# Patient Record
Sex: FEMALE | Race: WHITE | NOT HISPANIC OR LATINO | Employment: PART TIME | URBAN - METROPOLITAN AREA
[De-identification: names, ages, dates, MRNs, and addresses within clinical notes are randomized per-mention and may not be internally consistent; named-entity substitution may affect disease eponyms.]

---

## 2023-08-08 ENCOUNTER — HOSPITAL ENCOUNTER (OUTPATIENT)
Dept: DATA CONVERSION | Facility: HOSPITAL | Age: 18
Discharge: HOME | End: 2023-08-08

## 2023-08-08 DIAGNOSIS — Z02.0 ENCOUNTER FOR EXAMINATION FOR ADMISSION TO EDUCATIONAL INSTITUTION: ICD-10-CM

## 2023-08-08 DIAGNOSIS — Z00.00 ENCOUNTER FOR GENERAL ADULT MEDICAL EXAMINATION WITHOUT ABNORMAL FINDINGS: ICD-10-CM

## 2023-08-08 DIAGNOSIS — F41.9 ANXIETY DISORDER, UNSPECIFIED: ICD-10-CM

## 2023-08-08 LAB
ALBUMIN SERPL-MCNC: 4.3 GM/DL (ref 3.5–5)
ALBUMIN/GLOB SERPL: 1.5 RATIO (ref 1.5–3)
ALP BLD-CCNC: 68 U/L (ref 35–125)
ALT SERPL-CCNC: 14 U/L (ref 5–40)
ANION GAP SERPL CALCULATED.3IONS-SCNC: 13 MMOL/L (ref 0–19)
APPEARANCE PLAS: NORMAL
AST SERPL-CCNC: 20 U/L (ref 5–40)
BASOPHILS # BLD AUTO: 0.06 K/UL (ref 0–0.22)
BASOPHILS NFR BLD AUTO: 0.6 % (ref 0–1)
BILIRUB SERPL-MCNC: 0.3 MG/DL (ref 0.1–1.2)
BUN SERPL-MCNC: 12 MG/DL (ref 8–25)
BUN/CREAT SERPL: 17.1 RATIO (ref 8–21)
CALCIUM SERPL-MCNC: 9.3 MG/DL (ref 8.5–10.4)
CHLORIDE SERPL-SCNC: 104 MMOL/L (ref 97–107)
CHOLEST SERPL-MCNC: 174 MG/DL (ref 133–200)
CHOLEST/HDLC SERPL: 3.3 RATIO
CO2 SERPL-SCNC: 23 MMOL/L (ref 24–31)
COLOR SPUN FLD: YELLOW
CREAT SERPL-MCNC: 0.7 MG/DL (ref 0.4–1.6)
DEPRECATED RDW RBC AUTO: 42.5 FL (ref 37–54)
DIFFERENTIAL METHOD BLD: ABNORMAL
EOSINOPHIL # BLD AUTO: 0.09 K/UL (ref 0–0.45)
EOSINOPHIL NFR BLD: 0.9 % (ref 0–3)
ERYTHROCYTE [DISTWIDTH] IN BLOOD BY AUTOMATED COUNT: 13.2 % (ref 11.7–15)
FASTING STATUS PATIENT QL REPORTED: NORMAL
GFR SERPL CREATININE-BSD FRML MDRD: 127 ML/MIN/1.73 M2
GLOBULIN SER-MCNC: 2.8 G/DL (ref 1.9–3.7)
GLUCOSE SERPL-MCNC: 90 MG/DL (ref 65–99)
HBV SURFACE AB SER QL IA: NORMAL
HCT VFR BLD AUTO: 40.6 % (ref 36–44)
HDLC SERPL-MCNC: 53 MG/DL
HGB BLD-MCNC: 13.4 GM/DL (ref 12–15)
IMM GRANULOCYTES # BLD AUTO: 0.05 K/UL (ref 0–0.1)
LDLC SERPL CALC-MCNC: 104 MG/DL (ref 65–130)
LYMPHOCYTES # BLD AUTO: 4.89 K/UL (ref 1.2–3.2)
LYMPHOCYTES NFR BLD MANUAL: 49.9 % (ref 20–40)
MCH RBC QN AUTO: 28.9 PG (ref 26–34)
MCHC RBC AUTO-ENTMCNC: 33 % (ref 31–37)
MCV RBC AUTO: 87.7 FL (ref 80–100)
MEV IGG SER-ACNC: NORMAL
MONOCYTES # BLD AUTO: 0.57 K/UL (ref 0–0.8)
MONOCYTES NFR BLD MANUAL: 5.8 % (ref 0–8)
MUV IGG SER-ACNC: NORMAL
NEUTROPHILS # BLD AUTO: 4.14 K/UL
NEUTROPHILS # BLD AUTO: 4.14 K/UL (ref 1.5–8)
NEUTROPHILS.IMMATURE NFR BLD: 0.5 % (ref 0–1)
NEUTS SEG NFR BLD: 42.3 % (ref 50–70)
NRBC BLD-RTO: 0 /100 WBC
PLATELET # BLD AUTO: 319 K/UL (ref 150–450)
PMV BLD AUTO: 10.9 CU (ref 7–12.6)
POTASSIUM SERPL-SCNC: 3.5 MMOL/L (ref 3.4–5.1)
PROT SERPL-MCNC: 7.1 G/DL (ref 5.9–7.9)
RBC # BLD AUTO: 4.63 M/UL (ref 4–4.9)
RUBV IGG SER-ACNC: 52.27 IU/ML
SODIUM SERPL-SCNC: 140 MMOL/L (ref 133–145)
T4 FREE SERPL-MCNC: 1.4 NG/DL (ref 0.9–1.7)
TRIGL SERPL-MCNC: 84 MG/DL (ref 40–150)
TSH SERPL DL<=0.05 MIU/L-ACNC: 4.92 MIU/L (ref 0.27–4.2)
VZV IGG SER-ACNC: NORMAL
WBC # BLD AUTO: 9.8 K/UL (ref 4.5–11)

## 2023-08-11 LAB
NIL(NEG) CONTROL SPOT COUNT: NORMAL
PANEL A SPOT COUNT: NORMAL
PANEL B SPOT COUNT: NORMAL
POS CONTROL SPOT COUNT: NORMAL
T SPOT RESULT: NORMAL

## 2023-11-04 DIAGNOSIS — E03.8 SUBCLINICAL HYPOTHYROIDISM: ICD-10-CM

## 2023-11-06 PROBLEM — E03.8 SUBCLINICAL HYPOTHYROIDISM: Status: ACTIVE | Noted: 2023-11-06

## 2023-11-06 RX ORDER — LEVOTHYROXINE SODIUM 50 UG/1
50 TABLET ORAL
Qty: 90 TABLET | Refills: 0 | Status: SHIPPED | OUTPATIENT
Start: 2023-11-06 | End: 2023-11-16 | Stop reason: SDUPTHER

## 2023-11-15 ENCOUNTER — OFFICE VISIT (OUTPATIENT)
Dept: PRIMARY CARE | Facility: CLINIC | Age: 18
End: 2023-11-15
Payer: COMMERCIAL

## 2023-11-15 ENCOUNTER — LAB (OUTPATIENT)
Dept: LAB | Facility: LAB | Age: 18
End: 2023-11-15
Payer: COMMERCIAL

## 2023-11-15 VITALS
BODY MASS INDEX: 27.49 KG/M2 | SYSTOLIC BLOOD PRESSURE: 143 MMHG | HEART RATE: 74 BPM | WEIGHT: 165 LBS | HEIGHT: 65 IN | OXYGEN SATURATION: 99 % | DIASTOLIC BLOOD PRESSURE: 81 MMHG

## 2023-11-15 DIAGNOSIS — F41.9 ANXIETY: ICD-10-CM

## 2023-11-15 DIAGNOSIS — Z78.9 HEPATITIS B VACCINATION STATUS UNKNOWN: ICD-10-CM

## 2023-11-15 DIAGNOSIS — M54.31 SCIATICA OF RIGHT SIDE: ICD-10-CM

## 2023-11-15 DIAGNOSIS — E03.8 SUBCLINICAL HYPOTHYROIDISM: ICD-10-CM

## 2023-11-15 DIAGNOSIS — E03.8 SUBCLINICAL HYPOTHYROIDISM: Primary | ICD-10-CM

## 2023-11-15 LAB — TSH SERPL-ACNC: 1.12 MIU/L (ref 0.44–3.98)

## 2023-11-15 PROCEDURE — 1036F TOBACCO NON-USER: CPT | Performed by: NURSE PRACTITIONER

## 2023-11-15 PROCEDURE — 36415 COLL VENOUS BLD VENIPUNCTURE: CPT

## 2023-11-15 PROCEDURE — 99203 OFFICE O/P NEW LOW 30 MIN: CPT | Performed by: NURSE PRACTITIONER

## 2023-11-15 PROCEDURE — 86706 HEP B SURFACE ANTIBODY: CPT

## 2023-11-15 PROCEDURE — 84443 ASSAY THYROID STIM HORMONE: CPT

## 2023-11-15 RX ORDER — PREDNISONE 20 MG/1
TABLET ORAL
Qty: 21 TABLET | Refills: 0 | Status: SHIPPED | OUTPATIENT
Start: 2023-11-15 | End: 2024-04-24 | Stop reason: ALTCHOICE

## 2023-11-15 ASSESSMENT — PATIENT HEALTH QUESTIONNAIRE - PHQ9
2. FEELING DOWN, DEPRESSED OR HOPELESS: NOT AT ALL
1. LITTLE INTEREST OR PLEASURE IN DOING THINGS: NOT AT ALL
SUM OF ALL RESPONSES TO PHQ9 QUESTIONS 1 AND 2: 0

## 2023-11-15 NOTE — PATIENT INSTRUCTIONS
Thank you for seeing me today.  It was a pleasure to meet you!    #LBP W/SCIATICA  Rx Prednisone   Ice and Heat  Wear compression stockings to work and clinicals  May need PT     Get labs done today for Hep B and TSH     RTC AS NEEDED

## 2023-11-15 NOTE — PROGRESS NOTES
"Subjective   Chief Complaint   Patient presents with    New Patient Visit     NPV to establish new primary, Needing blood work to retest titers and check levels since starting levothyroxine, and would like back pain looked at.       Patient ID: Helen Arthur is a 18 y.o. female who presents for New Patient Visit (NPV to establish new primary, Needing blood work to retest titers and check levels since starting levothyroxine, and would like back pain looked at.).    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 [employer]  She is currently dating and is sexually active  No children   Lives w/her parents    PMH: Anxiety, HYPOTHYROIDISM   PSH: Denies     LMP: \"now\"    In Aug 2023 Hep B was negative, so pt received 2 hepatitis B vaccines at pharmacy and is requesting to be re-titered for Hep B    Also her TSH was 4.92, so she was started on Levothyroxine 50 mcg daily  Needs level rechecked     Pt also c/o mid low back pain that radiates right leg  +shooting pain  Denies loss of bowel/bladder function  Had L/S xray in Aug 2023---> normal   Pt was given Prednisone 20 mg x 5 days and Flexeril PRN  Pt states she did not have relief with either   Has pain the worst when she sits, but does have the pain with any movement and sitting       Review of Systems  All 13 systems were reviewed and are within normal limits except positive and pertinent negative responses which are noted below or in HPI.      Objective   /81   Pulse 74   Ht 1.651 m (5' 5\")   Wt 74.8 kg (165 lb)   SpO2 99%   BMI 27.46 kg/m²        Physical Exam  Vitals reviewed.   Cardiovascular:      Pulses: Normal pulses.   Pulmonary:      Effort: Pulmonary effort is normal.   Musculoskeletal:         General: Tenderness present. Normal range of motion.        Back:         Legs:    Neurological:      Mental Status: She is alert.         Assessment/Plan "   Problem List Items Addressed This Visit             ICD-10-CM    Subclinical hypothyroidism - Primary E03.8    Relevant Orders    TSH    Anxiety F41.9     Tried Celexa but it didn't help so she stopped  No concerns today          Sciatica of right side M54.31    Relevant Medications    predniSONE (Deltasone) 20 mg tablet     Other Visit Diagnoses         Codes    Hepatitis B vaccination status unknown     Z78.9    Relevant Orders    Hepatitis B Surface Antibody

## 2023-11-16 DIAGNOSIS — E03.8 SUBCLINICAL HYPOTHYROIDISM: Primary | ICD-10-CM

## 2023-11-16 LAB — HBV SURFACE AB SER-ACNC: >1000 MIU/ML

## 2023-11-16 RX ORDER — LEVOTHYROXINE SODIUM 50 UG/1
50 TABLET ORAL
Qty: 90 TABLET | Refills: 3 | Status: SHIPPED | OUTPATIENT
Start: 2023-11-16 | End: 2024-05-02 | Stop reason: SDUPTHER

## 2023-11-24 DIAGNOSIS — M54.31 SCIATICA OF RIGHT SIDE: Primary | ICD-10-CM

## 2023-12-19 ENCOUNTER — EVALUATION (OUTPATIENT)
Dept: PHYSICAL THERAPY | Facility: CLINIC | Age: 18
End: 2023-12-19
Payer: COMMERCIAL

## 2023-12-19 DIAGNOSIS — M54.31 SCIATICA OF RIGHT SIDE: ICD-10-CM

## 2023-12-19 PROCEDURE — 97110 THERAPEUTIC EXERCISES: CPT | Mod: GP | Performed by: PHYSICAL MEDICINE & REHABILITATION

## 2023-12-19 PROCEDURE — 97161 PT EVAL LOW COMPLEX 20 MIN: CPT | Mod: GP | Performed by: PHYSICAL MEDICINE & REHABILITATION

## 2023-12-19 ASSESSMENT — PAIN SCALES - GENERAL: PAINLEVEL_OUTOF10: 5 - MODERATE PAIN

## 2023-12-19 ASSESSMENT — PAIN - FUNCTIONAL ASSESSMENT: PAIN_FUNCTIONAL_ASSESSMENT: 0-10

## 2023-12-19 NOTE — PROGRESS NOTES
"  Physical Therapy  Physical Therapy Orthopedic Evaluation    Patient Name: Helen Arthur  MRN: [id number]  Today's Date: 12/19/2023  Time Calculation  Start Time: 0855  Stop Time: 0935  Time Calculation (min): 40 min    Insurance:  Visit number: 1   Insurance Type: DNBI    Current Problem  1. Sciatica of right side  Referral to Physical Therapy    Follow Up In Physical Therapy          General:  General  Reason for Referral: Low Back Pain  Referred By: AZALIA Martin  General Comment: Patient presents with low back pain along with radicular symptoms down to bilateral knees. She notes that symptoms will occasionally shoot down to both feet. Patient notes that this pain began approximately one year ago, and was not associated with any specific NERIS. Pain is aggravated with sitting down, squatting and performing hamstring curls in a prone position.    Precautions:   Precautions  Precautions Comment: None    Medical History Form: Reviewed (scanned into chart)    Subjective:   Subjective   Current Condition:   Same    Pain:  Pain Assessment: 0-10  Pain Score: 5 - Moderate pain (While sitting)  Aggravating Factors:  Sitting and Squatting  Relieving Factors:  Rest    Relevant Information (PMH & Previous Tests/Imaging): Lumbar/sacral X-rays were unremarkable  Previous Interventions/Treatments: None    Prior Level of Function (PLOF)  Patient previously independent with all ADLs  Exercise/Physical Activity: Lifts weights regularly  Work/School: Pharmacy technician, radiology technician student at [employer]    Patients Living Environment: Reviewed and no concern    Primary Language: English    Patient's Goal(s) for Therapy: \"Return to PLOF\"    Red Flags: Do you have any of the following? No  Fever/chills, unexplained weight changes, dizziness/fainting, unexplained change in bowel or bladder functions, unexplained malaise or muscle weakness, night pain/sweats, numbness or tingling    Objective:  Objective " "      ROM    Lumbar AROM (%)    Flexion: Fingertips to toes; painful  Extension: WNL        Strength Testing    Hip    (R)  (L)  Flexion: 4/5  4/5      Abduction: 4+/5  4+/5          Gait: No abnormalities observed        Special Tests    90-90 SLR Test: Negative  JUAN Test: Moderately limited bilaterally   Prone Segmental Instability Test: Positive      Radicular Symptoms: Patient reports radicular symptoms that will radiate down to both knees      Outcome Measures:  Other Measures  Oswestry Disablity Index (PLACIDO): 24%       Treatment Performed:  Therapeutic Exercise  Therapeutic Exercise Activity 1: Neutral spine curl up: 5x10\" holds each side  Therapeutic Exercise Activity 2: Side plank with knees bent: 3x15\" holds each side  Therapeutic Exercise Activity 3: Bird Dogs: 5x10\" holds each side    EDUCATION: Home exercise program, plan of care, activity modifications, pain management, and injury pathology  Outpatient Education  Education Comment: Access Code: [email or web address]  URL: [email or web address]  Date: 12/19/2023  Prepared by: Kayden Pettit    Exercises  - Neutral Curl Up with Straight Leg  - 1-2 x daily - 7 x weekly - 2 sets - 5 reps - 10 second hold  - Side Plank on Knees  - 1-2 x daily - 7 x weekly - 2 sets - 3 reps - 15 second hold  - Bird Dog  - 1-2 x daily - 7 x weekly - 1 sets - 5 reps - 10 second hold    Assessment: Patient presents with signs and symptoms consistent with low back pain, resulting in limited participation in pain-free ADLs and inability to perform at their prior level of function. Pt would benefit from physical therapy to address the impairments found & listed previously in the objective section in order to return to safe and pain-free ADLs and prior level of function.       Plan:  PT Plan: Skilled PT  PT Frequency: 1 time per week  Duration: 6 weeks  Onset Date: 12/19/22  Number of Treatments Authorized: 1 of 8  Rehab Potential: Good  Plan of Care Agreement: " Patient  Planned Interventions include: therapeutic exercise, self-care home management, manual therapy, therapeutic activities, gait training, neuromuscular coordination, vasopneumatic, dry needling, aquatic therapy      Goals: Set and discussed today  Active       PT Problem       Patient to score 10% or less on Modified PLACIDO in order to show improvement in overall quality of life.        Start:  12/19/23    Expected End:  02/02/24            Patient to achieve 5/5 strength in bilateral hip musculature in order to improve tolerance to lifting weights and exercising.        Start:  12/19/23    Expected End:  02/02/24            Patient to be able to sit for 1+ hours without any increased low back pain in order to be able to sit through a class lecture.        Start:  12/19/23    Expected End:  02/02/24            Patient to demonstrate independence with HEP in order to establish self management of symptoms.       Start:  12/19/23    Expected End:  02/02/24                Plan of care was developed with input and agreement by the patient      Kayden Pettit PT

## 2024-01-03 ENCOUNTER — TREATMENT (OUTPATIENT)
Dept: PHYSICAL THERAPY | Facility: CLINIC | Age: 19
End: 2024-01-03
Payer: COMMERCIAL

## 2024-01-03 DIAGNOSIS — M54.31 SCIATICA OF RIGHT SIDE: ICD-10-CM

## 2024-01-03 PROCEDURE — 97112 NEUROMUSCULAR REEDUCATION: CPT | Mod: GP | Performed by: PHYSICAL MEDICINE & REHABILITATION

## 2024-01-03 PROCEDURE — 97110 THERAPEUTIC EXERCISES: CPT | Mod: GP | Performed by: PHYSICAL MEDICINE & REHABILITATION

## 2024-01-03 PROCEDURE — 97530 THERAPEUTIC ACTIVITIES: CPT | Mod: GP | Performed by: PHYSICAL MEDICINE & REHABILITATION

## 2024-01-03 ASSESSMENT — PAIN SCALES - GENERAL: PAINLEVEL_OUTOF10: 5 - MODERATE PAIN

## 2024-01-03 ASSESSMENT — PAIN - FUNCTIONAL ASSESSMENT: PAIN_FUNCTIONAL_ASSESSMENT: 0-10

## 2024-01-03 NOTE — PROGRESS NOTES
"  Physical Therapy Treatment    Patient Name: Helen Arthur  MRN: [id number]  Today's Date: 1/3/2024  Time Calculation  Start Time: 0833  Stop Time: 0911  Time Calculation (min): 38 min  Current Problem  1. Sciatica of right side  Follow Up In Physical Therapy          Insurance:  Number of Treatments Authorized: 2 of 40          Subjective   General  Reason for Referral: Low Back Pain  Referred By: AZALIA Martin  General Comment: Patient reports minimal changes in her pain levels this visit. She has remained adherent to her HEP, and reports no issues with it thus far.    Performing HEP?: Yes    Precautions  Precautions  Precautions Comment: None  Pain  Pain Assessment: 0-10  Pain Score: 5 - Moderate pain    Objective   Treatments:    Therapeutic Exercise  Therapeutic Exercise Activity 1: Neutral spine curl up: 5x10\" holds each side  Therapeutic Exercise Activity 2: Side plank with knees bent: 3x15\" holds each side  Therapeutic Exercise Activity 3: Bird Dogs: 5x10\" holds each side  Therapeutic Exercise Activity 4: Multifidus twist: 10x10\" holds each side    Balance/Neuromuscular Re-Education  Balance/Neuromuscular Re-Education Activity 1: Hip hinge with dowel for cues x20  Balance/Neuromuscular Re-Education Activity 2: Pallof Press: 2x10x5\" holds each direction w 7.5# at Matrix  Balance/Neuromuscular Re-Education Activity 3: Squats with adjusted form: 3x5    Therapeutic Activity  Therapeutic Activity 1: KB Deadlift: 3x10 from 4\" box w 20#      Assessment:  PT Assessment  Assessment Comment: Today's visit focused on progressing activities to improve patient's trunk stability, hip hinging technique and functional ability. Patient demonstrated good effort toward today's progressions. No increased pain reported at the conclusion of the session. Overall response toward today's interventions was great.    Plan:  OP PT Plan  PT Plan: Skilled PT (Continue with current POC. Progress stability and strength as " tolerated.)  PT Frequency: 1 time per week  Duration: 6 weeks  Onset Date: 12/19/22  Number of Treatments Authorized: 2 of 40    Goals:  Active       PT Problem       Patient to score 10% or less on Modified PLACIDO in order to show improvement in overall quality of life.        Start:  12/19/23    Expected End:  02/02/24            Patient to achieve 5/5 strength in bilateral hip musculature in order to improve tolerance to lifting weights and exercising.        Start:  12/19/23    Expected End:  02/02/24            Patient to be able to sit for 1+ hours without any increased low back pain in order to be able to sit through a class lecture.        Start:  12/19/23    Expected End:  02/02/24            Patient to demonstrate independence with HEP in order to establish self management of symptoms.       Start:  12/19/23    Expected End:  02/02/24                 Kayden Pettit, PT

## 2024-01-10 ENCOUNTER — APPOINTMENT (OUTPATIENT)
Dept: PHYSICAL THERAPY | Facility: CLINIC | Age: 19
End: 2024-01-10
Payer: COMMERCIAL

## 2024-01-19 ENCOUNTER — APPOINTMENT (OUTPATIENT)
Dept: PHYSICAL THERAPY | Facility: CLINIC | Age: 19
End: 2024-01-19
Payer: COMMERCIAL

## 2024-01-26 ENCOUNTER — TREATMENT (OUTPATIENT)
Dept: PHYSICAL THERAPY | Facility: CLINIC | Age: 19
End: 2024-01-26
Payer: COMMERCIAL

## 2024-01-26 DIAGNOSIS — M54.31 SCIATICA OF RIGHT SIDE: ICD-10-CM

## 2024-01-26 PROCEDURE — 97530 THERAPEUTIC ACTIVITIES: CPT | Mod: GP | Performed by: PHYSICAL MEDICINE & REHABILITATION

## 2024-01-26 PROCEDURE — 97112 NEUROMUSCULAR REEDUCATION: CPT | Mod: GP | Performed by: PHYSICAL MEDICINE & REHABILITATION

## 2024-01-26 PROCEDURE — 97110 THERAPEUTIC EXERCISES: CPT | Mod: GP | Performed by: PHYSICAL MEDICINE & REHABILITATION

## 2024-01-26 ASSESSMENT — PAIN SCALES - GENERAL: PAINLEVEL_OUTOF10: 0 - NO PAIN

## 2024-01-26 ASSESSMENT — PAIN - FUNCTIONAL ASSESSMENT: PAIN_FUNCTIONAL_ASSESSMENT: 0-10

## 2024-01-26 NOTE — PROGRESS NOTES
"  Physical Therapy Treatment    Patient Name: Helen Arthur  MRN: [id number]  Today's Date: 1/26/2024  Time Calculation  Start Time: 0920  Stop Time: 0958  Time Calculation (min): 38 min  Current Problem  1. Sciatica of right side  Follow Up In Physical Therapy          Insurance:  Number of Treatments Authorized: 3 of 40          Subjective   General  Reason for Referral: Low Back Pain  Referred By: JOVANNI Martin-CNP  General Comment: Patient reports feeling improvement in pain levels since her last visit. She has been exercising frequently, working on trunk stability and correcting her squat pattern.    Performing HEP?: Yes    Precautions  Precautions  Precautions Comment: None  Pain  Pain Assessment: 0-10  Pain Score: 0 - No pain    Objective   Treatments:    Therapeutic Exercise  Therapeutic Exercise Activity 1: Neutral spine curl up: 5x10\" holds each side  Therapeutic Exercise Activity 2: Side plank with knees bent: 3x15\" holds each side  Therapeutic Exercise Activity 3: Bird Dogs: 5x10\" holds each side  Therapeutic Exercise Activity 4: BB Bent over Row: 3x5 w 65#    Balance/Neuromuscular Re-Education  Balance/Neuromuscular Re-Education Activity 1: Hip hinge with dowel for cues x20  Balance/Neuromuscular Re-Education Activity 2: Squats with yellow loop around knees 2x5  Balance/Neuromuscular Re-Education Activity 3: Pallof Press: 2x10x5\" holds each direction w 7.5# at Matrix    Therapeutic Activity  Therapeutic Activity 1: KB Deadlift: 3x10 from 4\" box w 35#  Therapeutic Activity 2: BB Deadlift: 3x10 w 65# and resistance band    Assessment:  PT Assessment  Assessment Comment: Today's visit focused on progressing activities to improve patient's trunk stability, hip hinging technique and functional ability. Patient demonstrated good effort toward today's progressions. No increased pain reported at the conclusion of the session. Overall response toward today's interventions was great.    Plan:  OP PT Plan  PT " Plan: Skilled PT (Continue with current POC. Progress stability and strength as tolerated.)  PT Frequency: 1 time per week  Duration: 6 weeks  Onset Date: 12/19/22  Number of Treatments Authorized: 3 of 40    Goals:  Active       PT Problem       Patient to score 10% or less on Modified PLACIDO in order to show improvement in overall quality of life.        Start:  12/19/23    Expected End:  02/02/24            Patient to achieve 5/5 strength in bilateral hip musculature in order to improve tolerance to lifting weights and exercising.        Start:  12/19/23    Expected End:  02/02/24            Patient to be able to sit for 1+ hours without any increased low back pain in order to be able to sit through a class lecture.        Start:  12/19/23    Expected End:  02/02/24            Patient to demonstrate independence with HEP in order to establish self management of symptoms.       Start:  12/19/23    Expected End:  02/02/24                 Kayden Pettit, PT

## 2024-02-09 ENCOUNTER — APPOINTMENT (OUTPATIENT)
Dept: PHYSICAL THERAPY | Facility: CLINIC | Age: 19
End: 2024-02-09
Payer: COMMERCIAL

## 2024-04-23 NOTE — PROGRESS NOTES
"Helen Arthur is a 18 y.o. female who presents today for c/o \"bilateral hand swelling\"    No chief complaint on file.      Symptoms: B/L hand swelling     Symptom onset has been {gen onset/course:033354} for a time period of *** {gen duration:721795}.     Severity is described as {gen severity:785736}.     Course of her symptoms over time is {gen onset/course:558409}.    Has taken ***     Allergies   Allergen Reactions    Sulfa (Sulfonamide Antibiotics) Unknown    Sulfamethoxazole-Trimethoprim Swelling       Review of Systems  ROS was completed and all systems are negative with the exception of what was noted in the the HPI.       Objective   Vitals:  There were no vitals taken for this visit.      Current Outpatient Medications   Medication Instructions    levothyroxine (SYNTHROID, LEVOXYL) 50 mcg, oral, Daily before breakfast    predniSONE (Deltasone) 20 mg tablet Take 3 tabs (60mg) daily for 5 days, then take 2 tabs (40mg) daily for 2 days, then take 1 tab (20mg) daily for 2 days.         Physical Exam    Assessment/Plan   {Assess/PlanSmartLinks:12530}          "

## 2024-04-24 ENCOUNTER — TELEMEDICINE (OUTPATIENT)
Dept: PRIMARY CARE | Facility: CLINIC | Age: 19
End: 2024-04-24
Payer: COMMERCIAL

## 2024-04-24 DIAGNOSIS — E03.8 SUBCLINICAL HYPOTHYROIDISM: ICD-10-CM

## 2024-04-24 DIAGNOSIS — R60.0 LOCALIZED EDEMA: Primary | ICD-10-CM

## 2024-04-24 PROCEDURE — 99213 OFFICE O/P EST LOW 20 MIN: CPT | Performed by: NURSE PRACTITIONER

## 2024-04-24 NOTE — PROGRESS NOTES
Subjective   Patient ID: Helen Arthur is a 18 y.o. female who presents for No chief complaint on file..    This is a virtual visit with video.  Patient has requested and is agreeable to be on video. I performed this visit using real-time telehealth tools; including audio/video connection between the pt. and I at [facility].       Patient was scheduled primary care who was sick.  So staff rescheduled her as a virtual visit.  Patient was seen on camera sitting in her car.  She states that she has been having on and off swelling in her hands, belching and some dizziness when she is moving around bending and working out.  She states she works out regularly and her fluid intake is very high and she is very cognizant of her protein intake.  She is wondering if she has a vitamin deficiency.    I discussed with her the need to bring her in for a in person visit to check vital signs and then we can decide on the next course of treatment.  I will review this patient with her primary care provider.         Review of Systems    Objective   There were no vitals taken for this visit.    Physical Exam  Constitutional:       Comments: Seen on video sitting in her car no distress smiling interactive and very pleasant.  Her hands are on her steering wheel they do not look edematous on video.  No shortness of breath and speaks in full sentences without difficulty         Assessment/Plan   Diagnoses and all orders for this visit:  Localized edema  Comments:  Many possible causes I would like to see what her vital signs are.  We should check blood work including her TSH and free T4 and BMP.  Patient is agreeable  Subclinical hypothyroidism    MA will reach out to patient to get her scheduled for inpatient visit

## 2024-04-30 NOTE — PROGRESS NOTES
"Subjective   Chief Complaint   Patient presents with    SDS     Patient comes in today as a SDS for swelling of her hands and legs. She states this has been going on for 2 weeks.        Patient ID: Helen Arthur is a 18 y.o. female who presents for SDS (Patient comes in today as a SDS for swelling of her hands and legs. She states this has been going on for 2 weeks. ).    HPI  Helen is a 19 yo female presenting today for c/o \"hand swelling\"     Onset: 2 weeks   States sometimes she wakes up with them swollen and other days it occurs later in the day   Also has some tingling in her hands on and off   Denies neck pain   States she eats \"healthy\" and doesn't feel she consumes foods high in salt     Pt denies any CP/Palpitations/SOB/fatigue   /72    Allergies   Allergen Reactions    Sulfa (Sulfonamide Antibiotics) Unknown    Sulfamethoxazole-Trimethoprim Swelling       Review of Systems  ROS was completed and all systems are negative with the exception of what was noted in the the HPI.       Objective   /72   Pulse 65   Ht 1.651 m (5' 5\")   Wt 67.9 kg (149 lb 12.8 oz)   SpO2 98%   BMI 24.93 kg/m²      Current Outpatient Medications   Medication Instructions    levothyroxine (SYNTHROID, LEVOXYL) 50 mcg, oral, Daily before breakfast       Physical Exam  Vitals reviewed.   Cardiovascular:      Pulses: Normal pulses.      Heart sounds: Normal heart sounds. No murmur heard.  Pulmonary:      Effort: Pulmonary effort is normal.      Breath sounds: Normal breath sounds.   Musculoskeletal:      Right hand: No swelling. Normal range of motion. Normal strength. Normal sensation.      Left hand: No swelling. Normal range of motion. Normal strength. Normal sensation.      Right lower leg: No edema.      Left lower leg: No edema.      Comments: No hand swelling noted    Neurological:      Mental Status: She is alert.         Assessment/Plan   Problem List Items Addressed This Visit    None  Visit Diagnoses         " Codes    Bilateral hand swelling    -  Primary M79.89    Relevant Orders    TSH    CBC    Basic Metabolic Panel    Vitamin D 25-Hydroxy,Total (for eval of Vitamin D levels)    Vitamin B12

## 2024-05-01 ENCOUNTER — OFFICE VISIT (OUTPATIENT)
Dept: PRIMARY CARE | Facility: CLINIC | Age: 19
End: 2024-05-01
Payer: COMMERCIAL

## 2024-05-01 ENCOUNTER — LAB (OUTPATIENT)
Dept: LAB | Facility: LAB | Age: 19
End: 2024-05-01
Payer: COMMERCIAL

## 2024-05-01 VITALS
WEIGHT: 149.8 LBS | HEART RATE: 65 BPM | HEIGHT: 65 IN | BODY MASS INDEX: 24.96 KG/M2 | SYSTOLIC BLOOD PRESSURE: 122 MMHG | DIASTOLIC BLOOD PRESSURE: 72 MMHG | OXYGEN SATURATION: 98 %

## 2024-05-01 DIAGNOSIS — M79.89 BILATERAL HAND SWELLING: ICD-10-CM

## 2024-05-01 DIAGNOSIS — M79.89 BILATERAL HAND SWELLING: Primary | ICD-10-CM

## 2024-05-01 LAB
25(OH)D3 SERPL-MCNC: 24 NG/ML (ref 30–100)
ANION GAP SERPL CALC-SCNC: 12 MMOL/L (ref 10–20)
BUN SERPL-MCNC: 11 MG/DL (ref 6–23)
CALCIUM SERPL-MCNC: 9.5 MG/DL (ref 8.6–10.3)
CHLORIDE SERPL-SCNC: 102 MMOL/L (ref 98–107)
CO2 SERPL-SCNC: 29 MMOL/L (ref 21–32)
CREAT SERPL-MCNC: 0.75 MG/DL (ref 0.5–1.05)
EGFRCR SERPLBLD CKD-EPI 2021: >90 ML/MIN/1.73M*2
ERYTHROCYTE [DISTWIDTH] IN BLOOD BY AUTOMATED COUNT: 13.2 % (ref 11.5–14.5)
GLUCOSE SERPL-MCNC: 68 MG/DL (ref 74–99)
HCT VFR BLD AUTO: 42.1 % (ref 36–46)
HGB BLD-MCNC: 13.7 G/DL (ref 12–16)
MCH RBC QN AUTO: 29.6 PG (ref 26–34)
MCHC RBC AUTO-ENTMCNC: 32.5 G/DL (ref 32–36)
MCV RBC AUTO: 91 FL (ref 80–100)
NRBC BLD-RTO: 0 /100 WBCS (ref 0–0)
PLATELET # BLD AUTO: 296 X10*3/UL (ref 150–450)
POTASSIUM SERPL-SCNC: 4 MMOL/L (ref 3.5–5.3)
RBC # BLD AUTO: 4.63 X10*6/UL (ref 4–5.2)
SODIUM SERPL-SCNC: 139 MMOL/L (ref 136–145)
TSH SERPL-ACNC: 1.85 MIU/L (ref 0.44–3.98)
VIT B12 SERPL-MCNC: 334 PG/ML (ref 211–911)
WBC # BLD AUTO: 6.9 X10*3/UL (ref 4.4–11.3)

## 2024-05-01 PROCEDURE — 80048 BASIC METABOLIC PNL TOTAL CA: CPT

## 2024-05-01 PROCEDURE — 99212 OFFICE O/P EST SF 10 MIN: CPT | Performed by: NURSE PRACTITIONER

## 2024-05-01 PROCEDURE — 36415 COLL VENOUS BLD VENIPUNCTURE: CPT

## 2024-05-01 PROCEDURE — 85027 COMPLETE CBC AUTOMATED: CPT

## 2024-05-01 PROCEDURE — 84443 ASSAY THYROID STIM HORMONE: CPT

## 2024-05-01 PROCEDURE — 1036F TOBACCO NON-USER: CPT | Performed by: NURSE PRACTITIONER

## 2024-05-01 PROCEDURE — 82306 VITAMIN D 25 HYDROXY: CPT

## 2024-05-01 PROCEDURE — 82607 VITAMIN B-12: CPT

## 2024-05-01 NOTE — PATIENT INSTRUCTIONS
Thank you for seeing me today.  It was a pleasure to see you again!    #Hx B/L HAND SWELLING   Labs ordered  Watch salt intake; avoid foods > 5% sodium  Exercise regularly    For assistance with scheduling referrals or consultations, please call [phone] or [phone].    For laboratory, radiology, and other tests, please call [phone] ([phone] for pediatrics).   If you do not get results within 7-10 days, or you have any questions or concerns, please send a message, call the office ([phone]), or return to the office for a follow-up appointment.     For acute/sick visits, if you are unable to get an office visit, you can do a  On Demand Virtual Visit that is accessible via your My Chart account.  For emergencies, call 9-1-1 or go to the nearest Emergency Department.     Please schedule additional appointment(s) to address concern(s) not addressed today.    Please review prescription inserts and published information for possible adverse effects of all medications.     In general, results are discussed over the phone or via  [employer].     You can see your health information, review clinical summaries from office visits & test results online when you follow your health with MY  Chart, a personal health record.   To sign up go to [email or web address].   If you need assistance with signing up or trouble getting into your account call [employer] Patient Line 24/7 at [phone]     [facility] AS NEEDED

## 2024-05-02 DIAGNOSIS — E03.8 SUBCLINICAL HYPOTHYROIDISM: ICD-10-CM

## 2024-05-02 DIAGNOSIS — E55.9 VITAMIN D DEFICIENCY: Primary | ICD-10-CM

## 2024-05-02 RX ORDER — ERGOCALCIFEROL 1.25 MG/1
50000 CAPSULE ORAL
Qty: 12 CAPSULE | Refills: 3 | Status: SHIPPED | OUTPATIENT
Start: 2024-05-05 | End: 2025-04-06

## 2024-05-02 RX ORDER — LEVOTHYROXINE SODIUM 50 UG/1
50 TABLET ORAL
Qty: 90 TABLET | Refills: 3 | Status: SHIPPED | OUTPATIENT
Start: 2024-05-02 | End: 2025-04-27

## 2024-05-02 NOTE — RESULT ENCOUNTER NOTE
Helen Arthur    I have reviewed all of your blood work and your vitamin D is again very low. I have sent the prescription to your pharmacy. Please take as prescribed.     The rest of your labs were fine.     If you have any questions, please feel free to call my office.    Take Care,   Lori

## 2024-07-01 DIAGNOSIS — Z11.1 SCREENING FOR TUBERCULOSIS: Primary | ICD-10-CM

## 2024-07-09 ENCOUNTER — LAB (OUTPATIENT)
Dept: LAB | Facility: LAB | Age: 19
End: 2024-07-09
Payer: COMMERCIAL

## 2024-07-09 DIAGNOSIS — Z11.1 SCREENING FOR TUBERCULOSIS: ICD-10-CM

## 2024-07-09 PROCEDURE — 36415 COLL VENOUS BLD VENIPUNCTURE: CPT

## 2024-07-09 PROCEDURE — 86481 TB AG RESPONSE T-CELL SUSP: CPT

## 2024-07-12 LAB
NIL(NEG) CONTROL SPOT COUNT: NORMAL
PANEL A SPOT COUNT: 0
PANEL B SPOT COUNT: 0
POS CONTROL SPOT COUNT: NORMAL
T-SPOT. TB INTERPRETATION: NEGATIVE

## 2024-10-24 ENCOUNTER — APPOINTMENT (OUTPATIENT)
Dept: PRIMARY CARE | Facility: CLINIC | Age: 19
End: 2024-10-24
Payer: COMMERCIAL

## 2024-10-24 VITALS
HEART RATE: 67 BPM | WEIGHT: 146.3 LBS | OXYGEN SATURATION: 98 % | HEIGHT: 65 IN | BODY MASS INDEX: 24.37 KG/M2 | SYSTOLIC BLOOD PRESSURE: 119 MMHG | DIASTOLIC BLOOD PRESSURE: 76 MMHG

## 2024-10-24 DIAGNOSIS — J30.9 ALLERGIC RHINITIS, UNSPECIFIED SEASONALITY, UNSPECIFIED TRIGGER: Primary | ICD-10-CM

## 2024-10-24 DIAGNOSIS — G44.209 ACUTE NON INTRACTABLE TENSION-TYPE HEADACHE: ICD-10-CM

## 2024-10-24 PROCEDURE — 1036F TOBACCO NON-USER: CPT | Performed by: NURSE PRACTITIONER

## 2024-10-24 PROCEDURE — 3008F BODY MASS INDEX DOCD: CPT | Performed by: NURSE PRACTITIONER

## 2024-10-24 PROCEDURE — 99212 OFFICE O/P EST SF 10 MIN: CPT | Performed by: NURSE PRACTITIONER

## 2024-10-24 RX ORDER — FLUTICASONE PROPIONATE 50 MCG
1 SPRAY, SUSPENSION (ML) NASAL DAILY
Qty: 18.2 ML | Refills: 0 | Status: SHIPPED | OUTPATIENT
Start: 2024-10-24

## 2024-10-24 NOTE — PROGRESS NOTES
Subjective   Chief Complaint   Patient presents with    Headache     Patient is coming in today with a complaint of frequent headaches he last few months. Patient states if she is sitting and stand up it gets worse. Makes her dizzy       Patient ID: Helen Arthur is a 19 y.o. female who presents for Headache (Patient is coming in today with a complaint of frequent headaches he last few months. Patient states if she is sitting and stand up it gets worse. Makes her dizzy).    FERNY Cervantes is a 20 yo est female presenting today for c/o headache     Headache  Patient presents for evaluation of headache. Symptoms began about 3 months ago. Generally, the headaches last about several hours and occur several times per week. The headaches do not seem to be related to any time of the day. The headaches are usually squeezing and are located in band around head.  The patient rates her most severe headaches a 7 on a scale from 1 to 10. Recently, the headaches have been increasing in severity. Work attendance or other daily activities are not affected by the headaches. Precipitating factors include: none which have been determined. The headaches are usually not preceded by an aura. Associated neurologic symptoms: dizziness. The patient denies decreased physical activity, depression, loss of balance, speech difficulties, vision problems, and vomiting in the early morning. Home treatment has included ibuprofen with no improvement. Other history includes: nothing pertinent. Family history includes migraine headaches in grandmother and aunt.    Pt states she has gone to see a chiropractor 12 times and there has been no improvement     Pt states when she gets a headache she has noticed that she gets a stuffy nose      Allergies   Allergen Reactions    Sulfa (Sulfonamide Antibiotics) Unknown    Sulfamethoxazole-Trimethoprim Swelling       Review of Systems  ROS was completed and all systems are negative with the exception of what was  "noted in the the HPI.       Objective   /76   Pulse 67   Ht 1.651 m (5' 5\")   Wt 66.4 kg (146 lb 4.8 oz)   SpO2 98%   BMI 24.35 kg/m²      Current Outpatient Medications   Medication Instructions    ergocalciferol (VITAMIN D-2) 50,000 Units, oral, Once Weekly    fluticasone (Flonase) 50 mcg/actuation nasal spray 1 spray, Each Nostril, Daily, Shake gently. Before first use, prime pump. After use, clean tip and replace cap.    levothyroxine (SYNTHROID, LEVOXYL) 50 mcg, oral, Daily before breakfast         Physical Exam  HENT:      Nose:      Right Turbinates: Swollen.      Left Turbinates: Swollen.         Assessment & Plan  Allergic rhinitis, unspecified seasonality, unspecified trigger    Orders:    fluticasone (Flonase) 50 mcg/actuation nasal spray; Administer 1 spray into each nostril once daily. Shake gently. Before first use, prime pump. After use, clean tip and replace cap.    Acute non intractable tension-type headache  Try OTC cold and sinus medication           "

## 2024-10-24 NOTE — PATIENT INSTRUCTIONS
Thank you for seeing me today Helen Arthur, it was a pleasure to see you again!    I think you may be having sinus headache   Try flonase once daily and Advil cold and sinus    Keep me posted on your symptoms     For assistance with scheduling referrals or consultations, please call [phone] or [phone].    For laboratory, radiology, and other tests, please call [phone] ([phone] for pediatrics).   If you do not get results within 7-10 days, or you have any questions or concerns, please send a message, call the office ([phone]), or return to the office for a follow-up appointment.     For acute/sick visits, if you are unable to get an office visit, you can do a  On Demand Virtual Visit that is accessible via your My Chart account.  For emergencies, call 9-1-1 or go to the nearest Emergency Department.     Please schedule additional appointment(s) to address concern(s) not addressed today.    Please review prescription inserts and published information for possible adverse effects of all medications.     In general, results are discussed over the phone or via  [facility].     You can see your health information, review clinical summaries from office visits & test results online when you follow your health with MY  Chart, a personal health record.   To sign up go to [email or web address].   If you need assistance with signing up or trouble getting into your account call [facility] Patient Line 24/7 at [phone]     RT AS NEEDED     Have a nice day!  Lori Nicole

## 2024-11-17 DIAGNOSIS — J30.9 ALLERGIC RHINITIS, UNSPECIFIED SEASONALITY, UNSPECIFIED TRIGGER: ICD-10-CM

## 2024-11-18 RX ORDER — FLUTICASONE PROPIONATE 50 MCG
2 SPRAY, SUSPENSION (ML) NASAL DAILY
Qty: 144 ML | Refills: 3 | Status: SHIPPED | OUTPATIENT
Start: 2024-11-18

## 2024-11-26 ENCOUNTER — APPOINTMENT (OUTPATIENT)
Dept: OTOLARYNGOLOGY | Facility: CLINIC | Age: 19
End: 2024-11-26
Payer: COMMERCIAL

## 2024-11-26 ENCOUNTER — APPOINTMENT (OUTPATIENT)
Dept: AUDIOLOGY | Facility: CLINIC | Age: 19
End: 2024-11-26
Payer: COMMERCIAL

## 2024-11-26 VITALS — BODY MASS INDEX: 24.66 KG/M2 | TEMPERATURE: 96.9 F | WEIGHT: 148 LBS | HEIGHT: 65 IN

## 2024-11-26 DIAGNOSIS — M26.621 ARTHRALGIA OF RIGHT TEMPOROMANDIBULAR JOINT: ICD-10-CM

## 2024-11-26 DIAGNOSIS — H92.01 RIGHT EAR PAIN: Primary | ICD-10-CM

## 2024-11-26 DIAGNOSIS — J32.9 CHRONIC SINUSITIS, UNSPECIFIED LOCATION: Primary | ICD-10-CM

## 2024-11-26 DIAGNOSIS — R51.9 NONINTRACTABLE HEADACHE, UNSPECIFIED CHRONICITY PATTERN, UNSPECIFIED HEADACHE TYPE: ICD-10-CM

## 2024-11-26 DIAGNOSIS — J34.3 HYPERTROPHY OF INFERIOR NASAL TURBINATE: ICD-10-CM

## 2024-11-26 DIAGNOSIS — R42 DIZZINESS: ICD-10-CM

## 2024-11-26 PROCEDURE — 92550 TYMPANOMETRY & REFLEX THRESH: CPT | Performed by: AUDIOLOGIST

## 2024-11-26 PROCEDURE — 92557 COMPREHENSIVE HEARING TEST: CPT | Performed by: AUDIOLOGIST

## 2024-11-26 PROCEDURE — 1036F TOBACCO NON-USER: CPT | Performed by: OTOLARYNGOLOGY

## 2024-11-26 PROCEDURE — 99203 OFFICE O/P NEW LOW 30 MIN: CPT | Performed by: OTOLARYNGOLOGY

## 2024-11-26 PROCEDURE — 3008F BODY MASS INDEX DOCD: CPT | Performed by: OTOLARYNGOLOGY

## 2024-11-26 PROCEDURE — 31231 NASAL ENDOSCOPY DX: CPT | Performed by: OTOLARYNGOLOGY

## 2024-11-26 NOTE — PROGRESS NOTES
"FERNY Arthur is a 19 y.o. female with at least a year of headache, primarily between the eyes.  Also with right ear pain.  No hearing loss.  She has been on nasal sprays and this has not seemed to help the above.  She is able to breathe reasonably well through her nose although has congestion at times.  She has not been treated for sinusitis with antibiotics at all recently.      History reviewed. No pertinent past medical history.         Medications:     Current Outpatient Medications:     levothyroxine (Synthroid, Levoxyl) 50 mcg tablet, Take 1 tablet (50 mcg) by mouth once daily in the morning. Take before meals., Disp: 90 tablet, Rfl: 3    ergocalciferol (Vitamin D-2) 1.25 MG (18714 UT) capsule, Take 1 capsule (50,000 Units) by mouth 1 (one) time per week. (Patient not taking: Reported on 11/26/2024), Disp: 12 capsule, Rfl: 3    fluticasone (Flonase) 50 mcg/actuation nasal spray, Administer 2 sprays into each nostril once daily. (Patient not taking: Reported on 11/26/2024), Disp: 144 mL, Rfl: 3     Allergies:  Allergies   Allergen Reactions    Sulfa (Sulfonamide Antibiotics) Unknown    Sulfamethoxazole-Trimethoprim Swelling        Physical Exam:  Last Recorded Vitals  Temperature 36.1 °C (96.9 °F), height 1.651 m (5' 5\"), weight 67.1 kg (148 lb).  General:     General appearance: Well-developed, well-nourished in no acute distress.       Voice:  normal       Head/face: Normal appearance; nontender to palpation     Facial nerve function: Normal and symmetric bilaterally.    Oral/oropharynx:     Oral vestibule: Normal labial and gingival mucosa     Tongue/floor of mouth: Normal without lesion     Oropharynx: Clear.  No lesions present of the hard/soft palate, posterior pharynx    Neck:     Neck: Normal appearance, trachea midline     Salivary glands: Normal to palpation bilaterally     Lymph nodes: No cervical lymphadenopathy to palpation     Thyroid: No thyromegaly.  No palpable nodules     Range of motion: " Normal    Neurological:     Cortical functions: Alert and oriented x3, appropriate affect       Larynx/hypopharynx:     Laryngeal findings: Mirror exam inadequate or limited secondary to enlarged base of tongue and/or excessive gagging    Ear:     Ear canal: Normal bilaterally     Tympanic membrane: Intact and mobile bilaterally     Pinna: Normal bilaterally     Hearing:  Gross hearing assessment normal by voice    Nose:     Visualized using: Flexible nasal endoscopy utilized secondary to inadequate anterior rhinoscopy  Nasopharynx: Inadequate mirror examination as above, endoscopy demonstrates normal nasopharynx without obstruction or mass     Nasal dorsum: Nontraumatic midline appearance     Septum: Midline  Inferior turbinates: Moderately congested     Mucosa: Bilateral, pink, normal appearing       ASSESSMENT/PLAN:  No evidence of infection in the sinuses.  I suspect there may be a TMJ component to her symptoms.  Given the duration of her symptoms and failure to respond to nasal sprays over extended period of time I recommended a CT scan of the sinuses which will also include the temporomandibular joint.  She may call when this is done.  I have recommended TMJ protocol and nonsteroidal anti-inflammatories and we will see what the studies show        Ganga Marie MD

## 2024-11-26 NOTE — PROGRESS NOTES
AUDIOLOGY ADULT AUDIOMETRIC EVALUATION    Name:  Helen Arthur  :  2005  Age:  19 y.o.  Date of Evaluation:  2024    Reason for visit: Ms. Arthur is seen in the clinic today at the request of otolaryngology for an audiologic evaluation.     HISTORY  Patient complains of right ear pain , headaches and dizziness at times. She reports tubes as a child and does not have tinnitus or aural fullness    EVALUATION  See scanned audiogram: “Media” > “Audiology Report”.      RESULTS  Otoscopic Evaluation:  Right Ear: clear ear canal  Left Ear: clear ear canal    Immittance Measures:  Tympanometry:  Right Ear: Type A, normal tympanic membrane mobility with normal middle ear pressure   Left Ear: Type A, normal tympanic membrane mobility with normal middle ear pressure     Acoustic Reflexes:  Ipsilateral Right Ear: 100 95 105  Ipsilateral Left Ear:   100 95 95   Contralateral Right Ear: did not evaluate  Contralateral Left Ear: did not evaluate    Distortion Product Otoacoustic Emissions (DPOAEs):  Right Ear: 750 TOO NOISY  PASS : 1-8 K HZ  Left Ear:   750 TOO NOISY   PASS 1-8 K HZ    Audiometry:  Test Technique and Reliability:  BEHAVIORAL   Standard audiometry via supra-aural headphones. Reliability is good.    Pure tone air and bone conduction audiometry:  Right Ear: WITHIN NORMAL LIMITS 125- 8 K HZ.  Left Ear:   WITHIN NORMAL LIMLITS 125- 8 K HZ.    Speech Audiometry (Word Recognition Scores):   Right Ear: 100% EXCELLENT  Left Ear:    100% EXCELLENT     IMPRESSIONS  WITHIN NORMAL LIMITS IN BOTH EARS WITH RIGHT PAIN, DIZZY AND HEADACHES.  The presence of acoustic reflexes within normal intensity limits is consistent with normal middle ear and brainstem function, and suggests that auditory sensitivity is not significantly impaired. An elevated or absent acoustic reflex threshold is consistent with a middle ear disorder, hearing loss in the stimulated ear, and/or interruption of neural innervation of the  stapedius muscle. Present DPOAEs suggest normal/near normal cochlear outer hair cell function and are consistent with no greater than a mild hearing loss at those frequencies. Absent DPOAEs are consistent with abnormal cochlear outer hair cell function and some degree of hearing loss at those frequencies.    RECOMMENDATIONS  - Follow up with otolaryngology today as scheduled.SPECIAL TESTS FOR DIZZY, HEADACHES AND RIGHT EAR PAIN AND NORMAL HEARING.   - Audiologic evaluation as needed.  - Annual audiologic evaluation, sooner if an acute change is noted.  - Audiologic evaluation in conjunction with otologic care, if an acute change is noted, and/or annually.  - Follow-up with medical care team as planned.    PATIENT EDUCATION  Discussed results, impressions and recommendations with the patient. Questions were addressed and the patient was encouraged to contact our office should concerns arise.    Time for this encounter:30 MINUTES     Stevie Siegel  Licensed Audiologist

## 2024-11-27 ENCOUNTER — TELEPHONE (OUTPATIENT)
Dept: OTOLARYNGOLOGY | Facility: CLINIC | Age: 19
End: 2024-11-27
Payer: COMMERCIAL

## 2024-11-27 ENCOUNTER — HOSPITAL ENCOUNTER (OUTPATIENT)
Dept: RADIOLOGY | Facility: HOSPITAL | Age: 19
Discharge: HOME | End: 2024-11-27
Payer: COMMERCIAL

## 2024-11-27 DIAGNOSIS — J32.9 CHRONIC SINUSITIS, UNSPECIFIED LOCATION: ICD-10-CM

## 2024-11-27 PROCEDURE — 70486 CT MAXILLOFACIAL W/O DYE: CPT | Performed by: RADIOLOGY

## 2024-11-27 PROCEDURE — 70486 CT MAXILLOFACIAL W/O DYE: CPT

## 2025-01-07 ENCOUNTER — APPOINTMENT (OUTPATIENT)
Dept: OTOLARYNGOLOGY | Facility: CLINIC | Age: 20
End: 2025-01-07
Payer: COMMERCIAL

## 2025-01-07 VITALS — TEMPERATURE: 96.8 F | WEIGHT: 150 LBS | HEIGHT: 65 IN | BODY MASS INDEX: 24.99 KG/M2

## 2025-01-07 DIAGNOSIS — R51.9 NONINTRACTABLE HEADACHE, UNSPECIFIED CHRONICITY PATTERN, UNSPECIFIED HEADACHE TYPE: Primary | ICD-10-CM

## 2025-01-07 PROCEDURE — 1036F TOBACCO NON-USER: CPT | Performed by: OTOLARYNGOLOGY

## 2025-01-07 PROCEDURE — 3008F BODY MASS INDEX DOCD: CPT | Performed by: OTOLARYNGOLOGY

## 2025-01-07 PROCEDURE — 99213 OFFICE O/P EST LOW 20 MIN: CPT | Performed by: OTOLARYNGOLOGY

## 2025-01-07 NOTE — PROGRESS NOTES
"FERNY Arthur is a 19 y.o. female follow-up headache.  She has gotten benefit from using mouthguard.  The anti-inflammatory medications did not make much difference.  She has also gotten some benefit from a natural supplement, BC powder.  CT scan of the sinuses without sinusitis    Prior history: With at least a year of headache, primarily between the eyes.  Also with right ear pain.  No hearing loss.  She has been on nasal sprays and this has not seemed to help the above.  She is able to breathe reasonably well through her nose although has congestion at times.  She has not been treated for sinusitis with antibiotics at all recently.      History reviewed. No pertinent past medical history.         Medications:     Current Outpatient Medications:     levothyroxine (Synthroid, Levoxyl) 50 mcg tablet, Take 1 tablet (50 mcg) by mouth once daily in the morning. Take before meals., Disp: 90 tablet, Rfl: 3    ergocalciferol (Vitamin D-2) 1.25 MG (35306 UT) capsule, Take 1 capsule (50,000 Units) by mouth 1 (one) time per week. (Patient not taking: Reported on 1/7/2025), Disp: 12 capsule, Rfl: 3    fluticasone (Flonase) 50 mcg/actuation nasal spray, Administer 2 sprays into each nostril once daily. (Patient not taking: Reported on 1/7/2025), Disp: 144 mL, Rfl: 3     Allergies:  Allergies   Allergen Reactions    Sulfa (Sulfonamide Antibiotics) Unknown    Sulfamethoxazole-Trimethoprim Swelling        Physical Exam:  Last Recorded Vitals  Temperature 36 °C (96.8 °F), height 1.651 m (5' 5\"), weight 68 kg (150 lb).  General:     General appearance: Well-developed, well-nourished in no acute distress.       Voice:  normal       Head/face: Normal appearance; nontender to palpation     Facial nerve function: Normal and symmetric bilaterally.    Oral/oropharynx:     Oral vestibule: Normal labial and gingival mucosa     Tongue/floor of mouth: Normal without lesion     Oropharynx: Clear.  No lesions present of the hard/soft " palate, posterior pharynx    Neck:     Neck: Normal appearance, trachea midline     Salivary glands: Normal to palpation bilaterally     Lymph nodes: No cervical lymphadenopathy to palpation     Thyroid: No thyromegaly.  No palpable nodules     Range of motion: Normal    Neurological:     Cortical functions: Alert and oriented x3, appropriate affect       Larynx/hypopharynx:     Laryngeal findings: Mirror exam inadequate or limited secondary to enlarged base of tongue and/or excessive gagging    Ear:     Ear canal: Normal bilaterally     Tympanic membrane: Intact and mobile bilaterally     Pinna: Normal bilaterally     Hearing:  Gross hearing assessment normal by voice    Nose:     Visualized using: Flexible nasal endoscopy utilized secondary to inadequate anterior rhinoscopy  Nasopharynx: Inadequate mirror examination as above, endoscopy demonstrates normal nasopharynx without obstruction or mass     Nasal dorsum: Nontraumatic midline appearance     Septum: Midline  Inferior turbinates: Moderately congested     Mucosa: Bilateral, pink, normal appearing       ASSESSMENT/PLAN:  Her pain is unlikely to be sinogenic.  Suspect TMJ is the most likely diagnosis and some of her treatment efforts have been helpful.  Recommend continued conservative treatments and if her symptoms continue to bother her, recommend dental/oral surgery evaluation and she may call for referral.  Regarding her nasal congestion, she may continue fluticasone and call with  new or worsened symptoms        Ganga Marie MD

## 2025-01-20 ENCOUNTER — TELEPHONE (OUTPATIENT)
Dept: OBSTETRICS AND GYNECOLOGY | Facility: CLINIC | Age: 20
End: 2025-01-20
Payer: COMMERCIAL

## 2025-01-30 ENCOUNTER — APPOINTMENT (OUTPATIENT)
Dept: OBSTETRICS AND GYNECOLOGY | Facility: CLINIC | Age: 20
End: 2025-01-30
Payer: COMMERCIAL

## 2025-01-30 VITALS
HEIGHT: 65 IN | DIASTOLIC BLOOD PRESSURE: 68 MMHG | SYSTOLIC BLOOD PRESSURE: 128 MMHG | WEIGHT: 152 LBS | BODY MASS INDEX: 25.33 KG/M2

## 2025-01-30 DIAGNOSIS — Z30.09 BIRTH CONTROL COUNSELING: ICD-10-CM

## 2025-01-30 DIAGNOSIS — Z12.4 CERVICAL CANCER SCREENING: ICD-10-CM

## 2025-01-30 DIAGNOSIS — Z01.419 WELL WOMAN EXAM WITH ROUTINE GYNECOLOGICAL EXAM: Primary | ICD-10-CM

## 2025-01-30 DIAGNOSIS — Z11.3 SCREENING FOR STDS (SEXUALLY TRANSMITTED DISEASES): ICD-10-CM

## 2025-01-30 PROCEDURE — 1036F TOBACCO NON-USER: CPT | Performed by: NURSE PRACTITIONER

## 2025-01-30 PROCEDURE — 87624 HPV HI-RISK TYP POOLED RSLT: CPT

## 2025-01-30 PROCEDURE — 99385 PREV VISIT NEW AGE 18-39: CPT | Performed by: NURSE PRACTITIONER

## 2025-01-30 PROCEDURE — 87491 CHLMYD TRACH DNA AMP PROBE: CPT

## 2025-01-30 PROCEDURE — 3008F BODY MASS INDEX DOCD: CPT | Performed by: NURSE PRACTITIONER

## 2025-01-30 PROCEDURE — 88141 CYTOPATH C/V INTERPRET: CPT | Performed by: STUDENT IN AN ORGANIZED HEALTH CARE EDUCATION/TRAINING PROGRAM

## 2025-01-30 PROCEDURE — 87591 N.GONORRHOEAE DNA AMP PROB: CPT

## 2025-01-30 PROCEDURE — 88175 CYTOPATH C/V AUTO FLUID REDO: CPT

## 2025-01-30 RX ORDER — MISOPROSTOL 200 UG/1
TABLET ORAL
Qty: 1 TABLET | Refills: 0 | Status: SHIPPED | OUTPATIENT
Start: 2025-01-30

## 2025-01-30 ASSESSMENT — ENCOUNTER SYMPTOMS
MUSCULOSKELETAL NEGATIVE: 1
ALLERGIC/IMMUNOLOGIC NEGATIVE: 1
ENDOCRINE NEGATIVE: 1
EYES NEGATIVE: 1
RESPIRATORY NEGATIVE: 1
GASTROINTESTINAL NEGATIVE: 1
HEMATOLOGIC/LYMPHATIC NEGATIVE: 1
CONSTITUTIONAL NEGATIVE: 1
PSYCHIATRIC NEGATIVE: 1
NEUROLOGICAL NEGATIVE: 1
CARDIOVASCULAR NEGATIVE: 1

## 2025-01-30 NOTE — PROGRESS NOTES
"Chief Complaint    NEW PT ANNUAL        HPI    PAP NEVER  STD SCREEN 10-26-21 NEG  GARDASIL X1    - TALK ABOUT IUD KYLEENA  Last edited by Solo Lala MA on 1/30/2025  1:18 PM.         19 y.o. G0 female presents for annual well woman exam.   She is new with the practice.   Patient's menstrual cycles are regular every 28 days, lasting 7 days. Denies abnormal bleeding. Reports dysmenorrhea, takes Ibuprofen as needed which helps.   She is sexually active with 1 partner(s) whom she has been with for 5 years. Denies dyspareunia.   Condoms: Yes. Wants to discuss birth control today. Considering Kyleena IUD. Was on the pill, patch, and ring in the past. Side effects including nausea, and weight gain.   Amendable to STD testing today. No hx. of STDs.  She denies any breast concerns.   No pap to date. Gardasil: x1. Counseled and advised, amendable to finishing series.   Denies pelvic pain.  Denies abnormal vaginal symptoms.  Denies urinary or bowel concerns.   She is non-smoker  PMH: Anxiety, hypothyroidism  Family h/o breast cancer: None  Family h/o GYN cancer: None  Family h/o colon cancer: None  Works as pharmacy tech. Going to school at [employer] for radiology.     /68   Ht 1.651 m (5' 5\")   Wt 68.9 kg (152 lb)   LMP 01/21/2025   BMI 25.29 kg/m²      Current Outpatient Medications   Medication Instructions    fluticasone (Flonase) 50 mcg/actuation nasal spray 2 sprays, Each Nostril, Daily    levothyroxine (SYNTHROID, LEVOXYL) 50 mcg, oral, Daily before breakfast      Review of Systems   Constitutional: Negative.    HENT: Negative.     Eyes: Negative.    Respiratory: Negative.     Cardiovascular: Negative.    Gastrointestinal: Negative.    Endocrine: Negative.    Genitourinary: Negative.    Musculoskeletal: Negative.    Skin: Negative.    Allergic/Immunologic: Negative.    Neurological: Negative.    Hematological: Negative.    Psychiatric/Behavioral: Negative.          Physical Exam  Constitutional:       " Appearance: Normal appearance.   HENT:      Head: Normocephalic.      Nose: Nose normal.   Cardiovascular:      Rate and Rhythm: Normal rate and regular rhythm.   Pulmonary:      Effort: Pulmonary effort is normal.      Breath sounds: Normal breath sounds.   Chest:   Breasts:     Right: Normal.      Left: Normal.   Abdominal:      General: Abdomen is flat.      Palpations: Abdomen is soft.   Genitourinary:     General: Normal vulva.      Vagina: Normal.      Cervix: Normal.      Uterus: Normal.       Adnexa: Right adnexa normal and left adnexa normal.      Rectum: Normal.      Comments: Pap collected  Musculoskeletal:         General: Normal range of motion.      Cervical back: Normal range of motion and neck supple.   Skin:     General: Skin is warm and dry.   Neurological:      Mental Status: She is alert.   Psychiatric:         Mood and Affect: Mood normal.         Behavior: Behavior normal.          Assessment/Plan:   1. Well woman exam with routine gynecological exam (Primary)  -Pap test collected today.   -STD screening for GC/CT.   -Counseled and advised Gardasil, insurance form filled out to complete series.   -Self breast awareness exams reviewed.  -Advised yearly well woman exams.   -Follow up sooner if needed.     2. Cervical cancer screening  - THINPREP PAP    3. Screening for STDs (sexually transmitted diseases)  - THINPREP PAP    4. Birth control counseling  -Discussion in depth today regarding birth control options including the contraceptive pill, patch, vaginal ring, Depo-Provera injection, IUDs, or Nexplanon arm implant.  -Patient desires Kyleena IUD.   -Common side effects, benefits, and bleeding expectations reviewed.   -Risks also discussed in detail including risks of infection, expulsion, malposition, perforation, ectopic pregnancy.   -Rx for misoprostol sent to patients pharmacy. Instructed to insert this intravaginally the night before insertion appointment. May take Ibuprofen ~1 hour prior  to insertion for pain.   - miSOPROStoL (Cytotec) 200 mcg tablet; Insert 1 tablet intravaginally the night prior to IUD procedure.  Dispense: 1 tablet; Refill: 0 d  -Will check with patients insurance and call to schedule.

## 2025-02-03 LAB
C TRACH RRNA SPEC QL NAA+PROBE: NEGATIVE
N GONORRHOEA DNA SPEC QL PROBE+SIG AMP: NEGATIVE

## 2025-02-14 LAB
CYTOLOGY CMNT CVX/VAG CYTO-IMP: NORMAL
HPV HR 12 DNA GENITAL QL NAA+PROBE: NEGATIVE
HPV HR GENOTYPES PNL CVX NAA+PROBE: NEGATIVE
HPV16 DNA SPEC QL NAA+PROBE: NEGATIVE
HPV18 DNA SPEC QL NAA+PROBE: NEGATIVE
LAB AP HPV GENOTYPE QUESTION: YES
LAB AP HPV HR: NORMAL
LAB AP PAP ADDITIONAL TESTS: NORMAL
LABORATORY COMMENT REPORT: NORMAL
LMP START DATE: NORMAL
PATH REPORT.TOTAL CANCER: NORMAL

## 2025-02-20 ENCOUNTER — APPOINTMENT (OUTPATIENT)
Dept: OBSTETRICS AND GYNECOLOGY | Facility: CLINIC | Age: 20
End: 2025-02-20
Payer: COMMERCIAL

## 2025-02-20 VITALS — BODY MASS INDEX: 25.29 KG/M2 | HEIGHT: 65 IN

## 2025-02-20 DIAGNOSIS — Z23 NEED FOR HPV VACCINE: ICD-10-CM

## 2025-02-20 DIAGNOSIS — Z30.430 ENCOUNTER FOR IUD INSERTION: Primary | ICD-10-CM

## 2025-02-20 LAB — PREGNANCY TEST URINE, POC: NEGATIVE

## 2025-02-20 PROCEDURE — 90651 9VHPV VACCINE 2/3 DOSE IM: CPT | Performed by: NURSE PRACTITIONER

## 2025-02-20 PROCEDURE — 58300 INSERT INTRAUTERINE DEVICE: CPT | Performed by: NURSE PRACTITIONER

## 2025-02-20 PROCEDURE — 81025 URINE PREGNANCY TEST: CPT | Performed by: NURSE PRACTITIONER

## 2025-02-20 PROCEDURE — 90471 IMMUNIZATION ADMIN: CPT | Performed by: NURSE PRACTITIONER

## 2025-02-20 NOTE — PROGRESS NOTES
"Patient ID: Helen Arthur is a 19 y.o. female presents for Kyleena IUD insertion.    Ht 1.651 m (5' 5\")   LMP 02/10/2025   BMI 25.29 kg/m²      IUD Insertion    Performed by: AZALIA Ortiz  Authorized by: AZALIA Ortiz    Procedure: IUD insertion    Consent obtained by patient, parent, or legal power of  - including discussion of procedure risks and benefits, patient questions answered, and patient education provided: yes    Pregnancy risk: reasonably certain the patient is not pregnant    Date/Time of Insertion:  2/20/2025 1:18 PM  Immediately prior to procedure a time out was called: yes    Pelvic exam performed: yes    Speculum placed in vagina: yes    Cervix cleaned and prepped: yes    Tenaculum/Allis/Ring Forceps applied to cervix: yes    Anesthesia used: no    Uterus sound depth (cm):  7  Cervix manually dilated: no    IUD inserted without complications: yes    Strings trimmed to (cm):  2  Patient tolerated procedure well: yes    Inserted with ultrasound guidance: no    Transvaginal sono confirmed fundal placement: yes    Intended removal date: 5 years      1. Encounter for IUD insertion (Primary)  - levonorgestreL (KYLEENA) IUD  - POCT pregnancy, urine manually resulted  - IUD Insertion    2. Need for HPV vaccine  - HPV 9-valent vaccine (GARDASIL 9)   "

## 2025-03-05 DIAGNOSIS — E03.8 SUBCLINICAL HYPOTHYROIDISM: ICD-10-CM

## 2025-03-05 RX ORDER — LEVOTHYROXINE SODIUM 50 UG/1
50 TABLET ORAL
Qty: 90 TABLET | Refills: 0 | Status: SHIPPED | OUTPATIENT
Start: 2025-03-05 | End: 2025-06-03

## 2025-03-05 NOTE — PROGRESS NOTES
"Subjective   Chief Complaint   Patient presents with    Follow-up     Helen Arthur is a 19 y.o. female is here today for a follow up for hypothyroidism patient is currently taking levothyroxine 50 mcg daily. Patient states when she is going to bed at night her hands go numb.         Patient ID: Helen Arthur is a 19 y.o. female who presents for Follow-up (Helen Arthur is a 19 y.o. female is here today for a follow up for hypothyroidism patient is currently taking levothyroxine 50 mcg daily. Patient states when she is going to bed at night her hands go numb./).    HPI  Helen presents today for f/u on hypothyroidism     Dx: HYPOTHYROIDISM     Pt presents today requesting blood test to check thyroid function as she has gained @ 10# in the past few months and is working out so she doesn't understand why she's gaining weight    She's also been experiencing numbness to both hands when she sleeps  Discussed positional changes and changing pillow type to improve neck position  F/U if needed     #HYPOTHYROIDISM  Dx'd 2022  Rx levothyroxine 50 mcg daily  TSH= 1.85 May 2024  TSH ordered today       Allergies   Allergen Reactions    Sulfa (Sulfonamide Antibiotics) Unknown    Sulfamethoxazole-Trimethoprim Swelling       Review of Systems  ROS was completed and all systems are negative with the exception of what was noted in the the HPI.       Objective   /74   Pulse 63   Ht 1.651 m (5' 5\")   Wt 69.5 kg (153 lb 3.2 oz)   LMP 02/10/2025   SpO2 99%   BMI 25.49 kg/m²      Current Outpatient Medications   Medication Instructions    fluticasone (Flonase) 50 mcg/actuation nasal spray 2 sprays, Each Nostril, Daily    levonorgestrel (Mirena) 20 mcg/24hr IUD 1 each, Once    levothyroxine (SYNTHROID, LEVOXYL) 50 mcg, oral, Daily before breakfast       History reviewed. No pertinent surgical history.      Physical Exam  Cardiovascular:      Pulses: Normal pulses.   Pulmonary:      Effort: Pulmonary effort is normal.      Breath " sounds: Normal breath sounds.   Neurological:      Mental Status: She is alert and oriented to person, place, and time.         Assessment & Plan  Subclinical hypothyroidism  TSH ordered   Orders:    TSH with reflex to Free T4 if abnormal; Future    Vitamin D deficiency  Check Vitamin D level   Orders:    Vitamin D 25-Hydroxy,Total (for eval of Vitamin D levels); Future

## 2025-03-06 ENCOUNTER — OFFICE VISIT (OUTPATIENT)
Dept: PRIMARY CARE | Facility: CLINIC | Age: 20
End: 2025-03-06
Payer: COMMERCIAL

## 2025-03-06 VITALS
WEIGHT: 153.2 LBS | HEIGHT: 65 IN | DIASTOLIC BLOOD PRESSURE: 74 MMHG | OXYGEN SATURATION: 99 % | HEART RATE: 63 BPM | SYSTOLIC BLOOD PRESSURE: 126 MMHG | BODY MASS INDEX: 25.52 KG/M2

## 2025-03-06 DIAGNOSIS — E03.8 SUBCLINICAL HYPOTHYROIDISM: Primary | ICD-10-CM

## 2025-03-06 DIAGNOSIS — E55.9 VITAMIN D DEFICIENCY: ICD-10-CM

## 2025-03-06 PROCEDURE — 99212 OFFICE O/P EST SF 10 MIN: CPT | Performed by: NURSE PRACTITIONER

## 2025-03-06 PROCEDURE — 3008F BODY MASS INDEX DOCD: CPT | Performed by: NURSE PRACTITIONER

## 2025-03-06 PROCEDURE — 1036F TOBACCO NON-USER: CPT | Performed by: NURSE PRACTITIONER

## 2025-03-06 ASSESSMENT — PATIENT HEALTH QUESTIONNAIRE - PHQ9
SUM OF ALL RESPONSES TO PHQ9 QUESTIONS 1 AND 2: 0
2. FEELING DOWN, DEPRESSED OR HOPELESS: NOT AT ALL
1. LITTLE INTEREST OR PLEASURE IN DOING THINGS: NOT AT ALL

## 2025-03-06 NOTE — ASSESSMENT & PLAN NOTE
Check Vitamin D level   Orders:    Vitamin D 25-Hydroxy,Total (for eval of Vitamin D levels); Future

## 2025-03-06 NOTE — PATIENT INSTRUCTIONS
Subclinical hypothyroidism  TSH ordered   Orders:    TSH with reflex to Free T4 if abnormal; Future    Vitamin D deficiency  Check Vitamin D level   Orders:    Vitamin D 25-Hydroxy,Total (for eval of Vitamin D levels); Future    RTC ANNUALLY AND AS NEEDED

## 2025-03-07 DIAGNOSIS — E55.9 VITAMIN D DEFICIENCY: Primary | ICD-10-CM

## 2025-03-07 LAB
25(OH)D3+25(OH)D2 SERPL-MCNC: 18 NG/ML (ref 30–100)
TSH SERPL-ACNC: 1.88 MIU/L

## 2025-03-07 RX ORDER — CHOLECALCIFEROL (VITAMIN D3) 1250 MCG
50000 TABLET ORAL WEEKLY
Qty: 12 TABLET | Refills: 3 | Status: SHIPPED | OUTPATIENT
Start: 2025-03-07 | End: 2026-02-06

## 2025-03-20 ENCOUNTER — APPOINTMENT (OUTPATIENT)
Dept: OBSTETRICS AND GYNECOLOGY | Facility: CLINIC | Age: 20
End: 2025-03-20
Payer: COMMERCIAL

## 2025-03-20 VITALS — DIASTOLIC BLOOD PRESSURE: 78 MMHG | WEIGHT: 152 LBS | SYSTOLIC BLOOD PRESSURE: 124 MMHG | BODY MASS INDEX: 25.29 KG/M2

## 2025-03-20 DIAGNOSIS — Z30.431 IUD CHECK UP: Primary | ICD-10-CM

## 2025-03-20 PROCEDURE — 99213 OFFICE O/P EST LOW 20 MIN: CPT | Performed by: NURSE PRACTITIONER

## 2025-03-20 ASSESSMENT — ENCOUNTER SYMPTOMS: NAUSEA: 1

## 2025-03-20 NOTE — PROGRESS NOTES
Chief Complaint    iud follow up        HPI    No issues with iud  Last edited by Gill Lopez MA on 3/20/2025  1:17 PM.         19 y.o.  female presents for Kyleena IUD check.  IUD inserted on 2025.  Patient is doing well.  Irregular bleeding after insertion which has stopped.   Reports intermittent cramping.  Has also been having some mild nausea since insertion.   She has been sexually active. Partner said he could feel strings and she requests if strings can be trimmed today.     /78   Wt 68.9 kg (152 lb)   LMP 02/10/2025   BMI 25.29 kg/m²      Current Outpatient Medications   Medication Instructions    cholecalciferol (VITAMIN D3) 50,000 Units, oral, Weekly    fluticasone (Flonase) 50 mcg/actuation nasal spray 2 sprays, Each Nostril, Daily    levonorgestrel (Mirena) 20 mcg/24hr IUD 1 each, Once    levothyroxine (SYNTHROID, LEVOXYL) 50 mcg, oral, Daily before breakfast        Review of Systems   Gastrointestinal:  Positive for nausea.   Genitourinary:  Positive for pelvic pain and vaginal bleeding.   All other systems reviewed and are negative.       Physical Exam  Constitutional:       Appearance: Normal appearance.   HENT:      Head: Normocephalic.      Nose: Nose normal.   Pulmonary:      Effort: Pulmonary effort is normal.   Genitourinary:     General: Normal vulva.      Vagina: Normal.      Cervix: Normal.      Uterus: Normal.       Adnexa: Right adnexa normal and left adnexa normal.      Comments: IUD strings present. Trimmed today.   Musculoskeletal:         General: Normal range of motion.      Cervical back: Normal range of motion and neck supple.   Neurological:      Mental Status: She is alert.   Psychiatric:         Mood and Affect: Mood normal.         Behavior: Behavior normal.          Assessment/Plan:  1. IUD check up (Primary)  -Exam is normal. IUD strings visualized on exam.  -No questions or concerns today.  -Follow up as needed or yearly for annual exams.

## 2025-06-20 DIAGNOSIS — Z11.1 SCREENING FOR TUBERCULOSIS: Primary | ICD-10-CM

## 2025-07-14 ENCOUNTER — APPOINTMENT (OUTPATIENT)
Dept: OBSTETRICS AND GYNECOLOGY | Facility: CLINIC | Age: 20
End: 2025-07-14
Payer: COMMERCIAL

## 2025-07-14 VITALS — BODY MASS INDEX: 25.46 KG/M2 | DIASTOLIC BLOOD PRESSURE: 74 MMHG | WEIGHT: 153 LBS | SYSTOLIC BLOOD PRESSURE: 124 MMHG

## 2025-07-14 DIAGNOSIS — Z32.02 PREGNANCY TEST NEGATIVE: ICD-10-CM

## 2025-07-14 DIAGNOSIS — Z97.5 BREAKTHROUGH BLEEDING ASSOCIATED WITH INTRAUTERINE DEVICE (IUD): ICD-10-CM

## 2025-07-14 DIAGNOSIS — Z30.431 IUD CHECK UP: Primary | ICD-10-CM

## 2025-07-14 DIAGNOSIS — R10.2 PELVIC PAIN IN FEMALE: ICD-10-CM

## 2025-07-14 DIAGNOSIS — N92.1 BREAKTHROUGH BLEEDING ASSOCIATED WITH INTRAUTERINE DEVICE (IUD): ICD-10-CM

## 2025-07-14 LAB — PREGNANCY TEST URINE, POC: NEGATIVE

## 2025-07-14 PROCEDURE — 81025 URINE PREGNANCY TEST: CPT | Performed by: NURSE PRACTITIONER

## 2025-07-14 PROCEDURE — 99213 OFFICE O/P EST LOW 20 MIN: CPT | Performed by: NURSE PRACTITIONER

## 2025-07-14 RX ORDER — LEVONORGESTREL 19.5 MG/1
INTRAUTERINE DEVICE INTRAUTERINE ONCE
COMMUNITY

## 2025-07-14 ASSESSMENT — ENCOUNTER SYMPTOMS
CHILLS: 0
DYSURIA: 0
FREQUENCY: 0
FEVER: 0

## 2025-07-14 NOTE — PROGRESS NOTES
Chief Complaint    abdominal pain and irregular bleeding        HPI    Pt states that she has been bleeding since  - it was heavy, currently spotting    Cramping off/on  Last edited by Gill Lopez MA on 2025 10:43 AM.         20 y.o.  female presents for Kyleena IUD check.  IUD inserted on 2025.  Initially after insertion her periods were regular and lasting ~5-7 days.   Reports issues with pelvic pain and irregular bleeding over the past ~2 months.   Had some intermittent which started at the beginning of .   Then started bleeding heavier on  and has been bleeding daily since.  Has tapered to spotting again.   Reports intermittent pelvic pain. Describes as cramping. Radiating to her glutes/rectal area. Takes Ibuprofen as needed which helps.   She is sexually active with 1 partner whom she has been with for 5 years. Neg NG/CT in 2025. Worsening dyspareunia.   Denies fever, chills, abnormal vaginal discharge, itching, or odor.   PMH: Anxiety, hypothyroidism.     /74   Wt 69.4 kg (153 lb)   LMP 2025 (Exact Date)   BMI 25.46 kg/m²      Current Outpatient Medications   Medication Instructions    cholecalciferol (VITAMIN D-3) 50,000 Units, oral, Weekly    fluticasone (Flonase) 50 mcg/actuation nasal spray 2 sprays, Each Nostril, Daily    levonorgestreL (Kyleena) 17.5 mcg/24 hr (5 yrs) 19.5 mg intrauterine device Once    levothyroxine (SYNTHROID, LEVOXYL) 50 mcg, oral, Daily before breakfast      Review of Systems   Constitutional:  Negative for chills and fever.   Genitourinary:  Positive for menstrual problem, pelvic pain and vaginal bleeding. Negative for dysuria, frequency, urgency, vaginal discharge and vaginal pain.        Physical Exam  Constitutional:       Appearance: Normal appearance.   HENT:      Head: Normocephalic.      Nose: Nose normal.   Pulmonary:      Effort: Pulmonary effort is normal.   Genitourinary:     General: Normal vulva.      Vagina:  Normal.      Cervix: Normal.      Uterus: Normal.       Adnexa: Right adnexa normal.        Left: Fullness present.       Comments: IUD strings present  Musculoskeletal:         General: Normal range of motion.      Cervical back: Normal range of motion and neck supple.   Neurological:      Mental Status: She is alert.   Psychiatric:         Mood and Affect: Mood normal.         Behavior: Behavior normal.          Assessment/Plan:  1. IUD check up (Primary)  -IUD strings present  -IO UCG is negative    2. Pregnancy test negative  - POCT pregnancy, urine manually resulted    3. Breakthrough bleeding associated with intrauterine device (IUD)  4. Pelvic pain in female  -Discussed possible etiologies of breakthrough bleeding with IUD and pelvic pain.   -Order provided for pelvic ultrasound for evaluation, patient to schedule.  - US PELVIS TRANSABDOMINAL WITH TRANSVAGINAL; Future   -Will call with results to discuss.  -May take Ibuprofen 600 mg every 6 hours as needed for pain.  -Go to ED for any sudden, severe pain that does not resolve or for any emergencies as needed.

## 2025-07-16 ENCOUNTER — HOSPITAL ENCOUNTER (OUTPATIENT)
Dept: RADIOLOGY | Facility: CLINIC | Age: 20
Discharge: HOME | End: 2025-07-16
Payer: COMMERCIAL

## 2025-07-16 ENCOUNTER — APPOINTMENT (OUTPATIENT)
Dept: RADIOLOGY | Facility: HOSPITAL | Age: 20
End: 2025-07-16
Payer: COMMERCIAL

## 2025-07-16 DIAGNOSIS — N92.1 BREAKTHROUGH BLEEDING ASSOCIATED WITH INTRAUTERINE DEVICE (IUD): ICD-10-CM

## 2025-07-16 DIAGNOSIS — R10.2 PELVIC PAIN IN FEMALE: ICD-10-CM

## 2025-07-16 DIAGNOSIS — Z97.5 BREAKTHROUGH BLEEDING ASSOCIATED WITH INTRAUTERINE DEVICE (IUD): ICD-10-CM

## 2025-07-16 PROCEDURE — 76856 US EXAM PELVIC COMPLETE: CPT | Performed by: RADIOLOGY

## 2025-07-16 PROCEDURE — 76830 TRANSVAGINAL US NON-OB: CPT | Performed by: RADIOLOGY

## 2025-07-16 PROCEDURE — 76856 US EXAM PELVIC COMPLETE: CPT
